# Patient Record
Sex: FEMALE | Race: BLACK OR AFRICAN AMERICAN | NOT HISPANIC OR LATINO | Employment: OTHER | URBAN - METROPOLITAN AREA
[De-identification: names, ages, dates, MRNs, and addresses within clinical notes are randomized per-mention and may not be internally consistent; named-entity substitution may affect disease eponyms.]

---

## 2024-04-16 ENCOUNTER — HOSPITAL ENCOUNTER (EMERGENCY)
Facility: CLINIC | Age: 29
Discharge: LEFT BEFORE DISCHARGE | End: 2024-04-16

## 2024-04-16 VITALS
DIASTOLIC BLOOD PRESSURE: 91 MMHG | HEIGHT: 59 IN | TEMPERATURE: 98.1 F | SYSTOLIC BLOOD PRESSURE: 150 MMHG | BODY MASS INDEX: 26.21 KG/M2 | OXYGEN SATURATION: 98 % | RESPIRATION RATE: 18 BRPM | WEIGHT: 130 LBS

## 2024-04-16 DIAGNOSIS — N64.4 BREAST PAIN: ICD-10-CM

## 2024-04-16 LAB
HCG INTACT+B SERPL-ACNC: <1 MIU/ML
HOLD SPECIMEN: NORMAL

## 2024-04-16 PROCEDURE — 36415 COLL VENOUS BLD VENIPUNCTURE: CPT

## 2024-04-16 PROCEDURE — 99283 EMERGENCY DEPT VISIT LOW MDM: CPT

## 2024-04-16 PROCEDURE — 84146 ASSAY OF PROLACTIN: CPT

## 2024-04-16 PROCEDURE — 84702 CHORIONIC GONADOTROPIN TEST: CPT

## 2024-04-16 ASSESSMENT — COLUMBIA-SUICIDE SEVERITY RATING SCALE - C-SSRS
2. HAVE YOU ACTUALLY HAD ANY THOUGHTS OF KILLING YOURSELF IN THE PAST MONTH?: NO
6. HAVE YOU EVER DONE ANYTHING, STARTED TO DO ANYTHING, OR PREPARED TO DO ANYTHING TO END YOUR LIFE?: NO
1. IN THE PAST MONTH, HAVE YOU WISHED YOU WERE DEAD OR WISHED YOU COULD GO TO SLEEP AND NOT WAKE UP?: NO

## 2024-04-16 NOTE — ED TRIAGE NOTES
PT is coming in tonight with bilateral breast pain. She is feeling more tender and soar. PT thought she was going to have a period on April 8th but it was more spotting and only lasted a day or two unlike her normal cycle of 7 days.     PT is requesting a blood test over a urine test.      Triage Assessment (Adult)       Row Name 04/16/24 1823          Triage Assessment    Airway WDL WDL        Respiratory WDL    Respiratory WDL WDL        Skin Circulation/Temperature WDL    Skin Circulation/Temperature WDL WDL        Cardiac WDL    Cardiac WDL WDL        Peripheral/Neurovascular WDL    Peripheral Neurovascular WDL WDL        Cognitive/Neuro/Behavioral WDL    Cognitive/Neuro/Behavioral WDL WDL

## 2024-04-16 NOTE — ED PROVIDER NOTES
EMERGENCY DEPARTMENT ENCOUNTER      NAME: Bea Amador  AGE: 28 year old female  YOB: 1995  MRN: [id number]  EVALUATION DATE & TIME: 04/16/24 6:47 PM    PCP: System, Provider Not In    ED PROVIDER: Nely Mckeon PA-C      CHIEF COMPLAINT:  Breast Pain and Pregnancy Test      FINAL IMPRESSION:  1. Breast pain          ED COURSE & MEDICAL DECISION MAKING:  Pertinent Labs & Imaging studies reviewed. (See chart for details)    The patient is an otherwise healthy 28 year old-year-old female presenting to the emergency department for evaluation of intermittent breast pain for the past several days, clear discharge from her right nipple, and concern for pregnancy. She experienced unilateral clear nipple discharge with past pregnancy. No personal or family history of cancer.     Initial vital signs reviewed and significant for elevated blood pressure, otherwise within normal limits. Patient was seen in emergency department waiting room due to emergency department capacity/boarding crisis; she was agreeable with this. On exam, the patient is clinically well appearing and non toxic appearing resting in exam chair in no acute distress. No chest wall tenderness to palpation. Breast exam deferred as no rooms available for sensitive exam at this time. Discussed plan for breast exam with chaperone present once room is available. The patient is agreeable with this plan.     Differential diagnosis includes pregnancy, premenstrual syndrome, cystic mastalgia, hyperprolactinemia, breast fibroadenoma, breast fibrocystic disease, malignancy, subareolar abscess.     Discussed options for workup and management with the patient, she is agreeable with plan to check pregnancy testing and laboratory studies as well as breast ultrasound.     Pregnancy testing negative. Prolactin level pending. Ultrasound was delayed, attempted to contact to follow up on delay.     I was informed that the patient left AMA prior to  obtaining ultrasound or conducting breast examination. Patient history, exam, and workup thus far most consistent with breast pain however workup is incomplete at this time. The patient has decided to leave AMA - we are unable to convince the patient to stay.      On examination, the patient has a normal mental status and understands their condition.  Risks of leaving including undiagnosed condition, permanent disability and/or death were discussed with the patient. The patient has had an opportunity to ask questions about their medical condition. The patient had been informed that they may return for care at any time, and has been recommended to follow up with their primary care provider System, Provider Not In  or clinic as soon as possible. We have answered all their questions.  Patient was provided patient declined to wait for discharge instructions.     At the conclusion of the encounter I discussed the results of all of the tests and the disposition. The questions were answered. The patient or family acknowledged understanding and was agreeable with the care plan.       ED COURSE:  6:50 PM I met and introduced myself to the patient. I gathered initial history and performed an initial physical exam. We discussed options and plan for diagnostics and treatment here in the ED.  On recheck, the patient continues to rest comfortably in no acute distress.  I discussed delay in ultrasound with nursing staff who will contact the ultrasound department.  10:00 PM I was informed that the patient left the emergency department AMA prior to her ultrasound.       Medical Decision Making  Obtained supplemental history:Supplemental history obtained?: No  Reviewed external records: External records reviewed?: No  Care impacted by chronic illness:N/A  Care significantly affected by social determinants of health:N/A  Did you consider but not order tests?: Work up considered but not performed and documented in chart, if  applicable  Did you interpret images independently?: Independent interpretation of ECG and images noted in documentation, when applicable.  Consultation discussion with other provider:Did you involve another provider (consultant, , pharmacy, etc.)?: No  Patient left AMA      CRITICAL CARE:  Not applicable      MEDICATIONS GIVEN IN THE EMERGENCY:  Medications - No data to display    NEW PRESCRIPTIONS STARTED AT TODAY'S ER VISIT  There are no discharge medications for this patient.         =================================================================    HPI    Patient information was obtained from: Self    Use of Intrepreter: N/A       Bea Amador is a 28 year old female with pertinent medical history of multiple pregnancies who presents to the emergency department for evaluation of breast pain with clear nipple discharge.    Patient reports she started having breast pain 3 to 4 days ago that comes and goes. Patient reports that she noticed clear discharge leaking from her right nipple and that she has had this during pregnancy in the past. Patient says she might be pregnant, and requests a blood pregnancy test. Patient reports 2 past pregnancies. Patient reports that her last menstrual cycle started on 4/6/2024, but lasted for only 1 day with spotting and breast tenderness. Patent reports her last regular menstruation was on 3/10/2024. Patient reports that breast tenderness at baseline during menstruation; however this time it is worse. Patient denies birth control. Patient denies any recent injuries. Patient denies personal or family history of cancer. Patient does not take prescription medication on a regular basis. Patient states that she has taken herbal supplements in the past such as sea eli, but denies taking any supplements recently. Patient denies fever, chills, nausea, vomiting, numbness, weakness, tingling, coughing. Patient reports a history of acid reflux. Patient states she moved here two  "weeks ago and does not have a primary care physician or clinic yet.      PAST MEDICAL HISTORY:  No past medical history on file.    PAST SURGICAL HISTORY:  No past surgical history on file.    CURRENT MEDICATIONS:    None       ALLERGIES:  No Known Allergies    FAMILY HISTORY:  No family history on file.    SOCIAL HISTORY:        VITALS:    First Vitals:  No data found.      No data found.        PHYSICAL EXAM  VITAL SIGNS: BP (!) 150/91   Temp 98.1  F (36.7  C) (Oral)   Resp 18   Ht 1.499 m (4' 11\")   Wt 59 kg (130 lb)   LMP 03/10/2024   SpO2 98%   BMI 26.26 kg/m     GENERAL: Awake, alert, answering questions appropriately, in no acute distress.  HEENT: Moist mucous membranes. Posterior oropharynx clear with no erythema, no exudate. No tonsillar hypertrophy. Uvula midline. Tolerating secretions, no drooling.    SPEECH:  Easy to understand speech, Normal volume and sergio. Normal phonation.  PULMONARY: No respiratory distress, Breathing comfortably on room air. Lungs clear to auscultation bilaterally.  CARDIOVASCULAR: Regular rate and rhythm, radial pulses present, symmetric, and normal.  CHEST: No chest wall tenderness to palpation.  ABDOMINAL: Soft, Nondistended, Nontender, No rebound or guarding, No palpable masses. Bowel sounds present.  EXTREMITIES: Extremities are warm and well perfused. No lower extremity edema.  NEUROLOGIC: Moving all extremities spontaneously.   SKIN: Exposed areas of skin warm, dry, no rashes.  PSYCH: Normal mood and affect.           RADIOLOGY/LAB:  Reviewed all pertinent imaging. Please see official radiology report.  All pertinent labs reviewed and interpreted.  Results for orders placed or performed during the hospital encounter of 04/16/24   HCG quantitative pregnancy   Result Value Ref Range    hCG Quantitative <1 <5 mIU/mL   Result Value Ref Range    Prolactin 18 5 - 23 ng/mL   Extra Urine Collection   Result Value Ref Range    Hold Specimen JIC                  I, Ana " Liz, am serving as a scribe to document services personally performed by Nely Mckeon PA-C, based on my observation and the provider's statements to me. I, Nely Mckeon PA-C attest that Ana Hill is acting in a scribe capacity, has observed my performance of the services and has documented them in accordance with my direction.         Nely Mckeon PA-C  Emergency Medicine  [facility] EMERGENCY ROOM  [address] [facility] [address]  [phone]  Dept: [phone]     Nely Mckeon PA-C  05/01/24 0116

## 2024-04-18 LAB — PROLACTIN SERPL 3RD IS-MCNC: 18 NG/ML (ref 5–23)
